# Patient Record
Sex: FEMALE
[De-identification: names, ages, dates, MRNs, and addresses within clinical notes are randomized per-mention and may not be internally consistent; named-entity substitution may affect disease eponyms.]

---

## 2021-03-09 ENCOUNTER — NURSE TRIAGE (OUTPATIENT)
Dept: OTHER | Facility: CLINIC | Age: 21
End: 2021-03-09

## 2021-03-09 NOTE — TELEPHONE ENCOUNTER
Reason for Disposition   Eye pain/discomfort and more than mild    Answer Assessment - Initial Assessment Questions  1. ONSET: \"When did the pain start? \" (e.g., minutes, hours, days)      Onset was 4 days ago    2. TIMING: \"Does the pain come and go, or has it been constant since it started? \" (e.g., constant, intermittent, fleeting)      Constant    3. SEVERITY: \"How bad is the pain? \"   (Scale 1-10; mild, moderate or severe)    - MILD (1-3): doesn't interfere with normal activities     - MODERATE (4-7): interferes with normal activities or awakens from sleep     - SEVERE (8-10): excruciating pain and patient unable to do normal activities      Current pain level 5/10    4. LOCATION: \"Where does it hurt? \"  (e.g., eyelid, eye, cheekbone)      Left eye    5. CAUSE: \"What do you think is causing the pain? \"      Unknown    6. VISION: \"Do you have blurred vision or changes in your vision? \"       Blurry    7. EYE DISCHARGE: \"Is there any discharge (pus) from the eye(s)? \"  If yes, ask: \"What color is it? \"       Crusty (yellowish color) in the morning    8. FEVER: \"Do you have a fever? \" If so, ask: \"What is it, how was it measured, and when did it start? \"       No fever    9. OTHER SYMPTOMS: \"Do you have any other symptoms? \" (e.g., headache, nasal discharge, facial rash)      Eye pain, Eye swelling, overnight discharge    10. PREGNANCY: \"Is there any chance you are pregnant? \" \"When was your last menstrual period? \"        NO    Protocols used: EYE PAIN-ADULT-[place]    Patient called to reports worsening left eye pain and swelling for the last 4 days. No daytime discharge is noted but patient reports that when she wakes in the morning her eye is crusty with a yellow colored discharge. Pain levels are 5/10 and the eyelids are swollen and red. Patient denies fever. She states that her vision is blurry. She was prompted to call because when she woke this morning her eye was swollen shut and crusted together.  Recommended patient go to her providers office now - patient will be going in to an Urgent Care this afternoon  Car advice provided.